# Patient Record
Sex: FEMALE | Race: WHITE
[De-identification: names, ages, dates, MRNs, and addresses within clinical notes are randomized per-mention and may not be internally consistent; named-entity substitution may affect disease eponyms.]

---

## 2023-02-27 ENCOUNTER — HOSPITAL ENCOUNTER (EMERGENCY)
Dept: HOSPITAL 4 - SED | Age: 28
LOS: 1 days | Discharge: HOME | End: 2023-02-28
Payer: COMMERCIAL

## 2023-02-27 VITALS — SYSTOLIC BLOOD PRESSURE: 121 MMHG

## 2023-02-27 VITALS — HEIGHT: 56 IN | WEIGHT: 110 LBS | BODY MASS INDEX: 24.75 KG/M2

## 2023-02-27 DIAGNOSIS — R50.9: ICD-10-CM

## 2023-02-27 DIAGNOSIS — B34.9: Primary | ICD-10-CM

## 2023-02-27 DIAGNOSIS — Z79.899: ICD-10-CM

## 2023-02-27 DIAGNOSIS — R11.2: ICD-10-CM

## 2023-02-27 DIAGNOSIS — E87.6: ICD-10-CM

## 2023-02-27 DIAGNOSIS — R19.7: ICD-10-CM

## 2023-02-27 DIAGNOSIS — Z20.822: ICD-10-CM

## 2023-02-27 PROCEDURE — 81025 URINE PREGNANCY TEST: CPT

## 2023-02-27 PROCEDURE — 81000 URINALYSIS NONAUTO W/SCOPE: CPT

## 2023-02-27 PROCEDURE — 87804 INFLUENZA ASSAY W/OPTIC: CPT

## 2023-02-27 PROCEDURE — 87086 URINE CULTURE/COLONY COUNT: CPT

## 2023-02-27 PROCEDURE — 83690 ASSAY OF LIPASE: CPT

## 2023-02-27 PROCEDURE — 87426 SARSCOV CORONAVIRUS AG IA: CPT

## 2023-02-27 PROCEDURE — 99284 EMERGENCY DEPT VISIT MOD MDM: CPT

## 2023-02-27 PROCEDURE — 71045 X-RAY EXAM CHEST 1 VIEW: CPT

## 2023-02-27 PROCEDURE — 36415 COLL VENOUS BLD VENIPUNCTURE: CPT

## 2023-02-27 PROCEDURE — 85025 COMPLETE CBC W/AUTO DIFF WBC: CPT

## 2023-02-27 PROCEDURE — 80053 COMPREHEN METABOLIC PANEL: CPT

## 2023-02-27 NOTE — NUR
Triaged and placed patient back to the waiting room. No acute respiratory 
distress at this time. VSS. Informed patient to notify ED staff for any changes 
in condition or worsening of symptoms while waiting to be seen by a provider. 
Patient verbalized understanding.

## 2023-02-28 VITALS — SYSTOLIC BLOOD PRESSURE: 121 MMHG

## 2023-02-28 LAB
ALBUMIN SERPL BCP-MCNC: 3.1 G/DL (ref 3.4–4.8)
ALT SERPL W P-5'-P-CCNC: 44 U/L (ref 12–78)
ANION GAP SERPL CALCULATED.3IONS-SCNC: 11 MMOL/L (ref 5–15)
APPEARANCE UR: (no result)
AST SERPL W P-5'-P-CCNC: 29 U/L (ref 10–37)
BACTERIA URNS QL MICRO: (no result) /HPF
BASOPHILS # BLD AUTO: 0 K/UL (ref 0–0.2)
BASOPHILS NFR BLD AUTO: 0.3 % (ref 0–2)
BILIRUB SERPL-MCNC: 0.2 MG/DL (ref 0–1)
BILIRUB UR QL STRIP: NEGATIVE
BUN SERPL-MCNC: 7 MG/DL (ref 8–21)
CALCIUM SERPL-MCNC: 8.4 MG/DL (ref 8.4–11)
CHLORIDE SERPL-SCNC: 102 MMOL/L (ref 98–107)
COLOR UR: YELLOW
CREAT SERPL-MCNC: 0.59 MG/DL (ref 0.55–1.3)
EOSINOPHIL # BLD AUTO: 0.1 K/UL (ref 0–0.4)
EOSINOPHIL NFR BLD AUTO: 0.8 % (ref 0–4)
ERYTHROCYTE [DISTWIDTH] IN BLOOD BY AUTOMATED COUNT: 13.5 % (ref 9–15)
GFR SERPL CREATININE-BSD FRML MDRD: 157 ML/MIN (ref 90–?)
GLUCOSE SERPL-MCNC: 115 MG/DL (ref 70–99)
GLUCOSE UR STRIP-MCNC: NEGATIVE MG/DL
HCT VFR BLD AUTO: 39.4 % (ref 36–48)
HGB BLD-MCNC: 13.2 G/DL (ref 12–16)
HGB UR QL STRIP: NEGATIVE
KETONES UR STRIP-MCNC: (no result) MG/DL
LEUKOCYTE ESTERASE UR QL STRIP: (no result)
LIPASE SERPL-CCNC: 52 U/L (ref 73–393)
LYMPHOCYTES # BLD AUTO: 1.3 K/UL (ref 1–5.5)
LYMPHOCYTES NFR BLD AUTO: 10.3 % (ref 20.5–51.5)
MCH RBC QN AUTO: 31 PG (ref 27–31)
MCHC RBC AUTO-ENTMCNC: 34 % (ref 32–36)
MCV RBC AUTO: 92 FL (ref 79–98)
MONOCYTES # BLD MANUAL: 1 K/UL (ref 0–1)
MONOCYTES # BLD MANUAL: 8.1 % (ref 1.7–9.3)
NEUTROPHILS # BLD AUTO: 10.1 K/UL (ref 1.8–7.7)
NEUTROPHILS NFR BLD AUTO: 80.5 % (ref 40–70)
NITRITE UR QL STRIP: NEGATIVE
PH UR STRIP: 6 [PH] (ref 5–8)
PLATELET # BLD AUTO: 333 K/UL (ref 130–430)
PROT UR QL STRIP: NEGATIVE
RBC # BLD AUTO: 4.29 MIL/UL (ref 4.2–6.2)
RBC #/AREA URNS HPF: (no result) /HPF (ref 0–3)
SP GR UR STRIP: 1.02 (ref 1–1.03)
UROBILINOGEN UR STRIP-MCNC: 1 MG/DL (ref 0.2–1)
WBC # BLD AUTO: 12.5 K/UL (ref 4.8–10.8)

## 2023-02-28 NOTE — NUR
Patient given written and verbal discharge instructions and verbalizes 
understanding.  ER MD Talyor discussed with patient the results and treatment 
provided. Patient in stable condition. ID arm band removed. 

Rx of Tyl xtra strength, Zofran, and Robitussin AC sent to preferred pharmacy. 
Patient educated on pain management and to follow up with PMD. P

Opportunity for questions provided and answered. Medication side effect fact 
sheet provided.

## 2023-02-28 NOTE — NUR
-------------------------------------------------------------------------------

           *** Note undone in [facility] - 02/28/23 at 0120 by [id number] ***            

-------------------------------------------------------------------------------

COVID/INFLUENZA swab collected and sent to lab.